# Patient Record
Sex: FEMALE | URBAN - METROPOLITAN AREA
[De-identification: names, ages, dates, MRNs, and addresses within clinical notes are randomized per-mention and may not be internally consistent; named-entity substitution may affect disease eponyms.]

---

## 2020-06-01 ENCOUNTER — OFFICE VISIT (OUTPATIENT)
Dept: URBAN - METROPOLITAN AREA SURGERY CENTER 8 | Facility: SURGERY CENTER | Age: 45
End: 2020-06-01

## 2020-06-08 ENCOUNTER — TELEPHONE ENCOUNTER (OUTPATIENT)
Dept: URBAN - METROPOLITAN AREA CLINIC 35 | Facility: CLINIC | Age: 45
End: 2020-06-08

## 2020-06-16 ENCOUNTER — OFFICE VISIT (OUTPATIENT)
Dept: URBAN - METROPOLITAN AREA CLINIC 31 | Facility: CLINIC | Age: 45
End: 2020-06-16

## 2020-06-16 VITALS — WEIGHT: 190 LBS | HEIGHT: 68 IN | BODY MASS INDEX: 28.79 KG/M2

## 2020-06-16 RX ORDER — AMITRIPTYLINE HYDROCHLORIDE 150 MG/1
1 TABLET, FILM COATED ORAL AT NIGHT
Status: ACTIVE | COMMUNITY

## 2020-06-16 RX ORDER — GINGER ROOT/GINGER ROOT EXT 262.5 MG
1 CAPSULE ORAL BID
Status: ACTIVE | COMMUNITY

## 2020-06-16 RX ORDER — DICLOFENAC POTASSIUM 50 MG/1
1 POWDER, FOR SOLUTION ORAL PRN
Status: ACTIVE | COMMUNITY

## 2020-06-16 RX ORDER — SODIUM SULFATE, POTASSIUM SULFATE, MAGNESIUM SULFATE 17.5; 3.13; 1.6 G/ML; G/ML; G/ML
AS DIRECTED SOLUTION, CONCENTRATE ORAL
Qty: 1 | Refills: 0 | Status: DISCONTINUED | COMMUNITY
Start: 2020-04-07

## 2020-06-16 RX ORDER — SUMATRIPTAN AND NAPROXEN SODIUM 85; 500 MG/1; MG/1
1 TABLET, FILM COATED ORAL PRN
Status: ACTIVE | COMMUNITY

## 2020-06-16 RX ORDER — LINACLOTIDE 290 UG/1
1 CAPSULE CAPSULE, GELATIN COATED ORAL ONCE A DAY
Qty: 90 CAPSULE | Refills: 2
Start: 2020-04-07

## 2020-06-16 RX ORDER — LINACLOTIDE 290 UG/1
1 CAPSULE CAPSULE, GELATIN COATED ORAL ONCE A DAY
Qty: 30 | Refills: 3 | Status: ACTIVE | COMMUNITY
Start: 2020-04-07

## 2020-06-16 NOTE — HPI-MIGRATED HPI
;     Colonoscopy Follow-Up : Patient presents today for a follow-up after the colonoscopy that was done on 06/01/2020. Patient was prescribed Linzess 290 mcg, daily, at her last office visit. She was having some constipation after her colonoscopy and was advised to take 1 capful of MiraLax along with the Linzess. Patient states this has worked very well. She takes the capful of MiraLax along with Linzess 290 mcg, daily. Patient is currently having 1 bowel movement per day with loose and unformed stools. She denies any blood or mucous in her stool. Patient denies melena. Patient requests refills of the Linzess 290 mcg, daily, sent in a 90 days supply, sent to confirmed pharmacy in EMR. ;

## 2020-11-17 ENCOUNTER — OFFICE VISIT (OUTPATIENT)
Dept: URBAN - METROPOLITAN AREA CLINIC 31 | Facility: CLINIC | Age: 45
End: 2020-11-17

## 2020-11-17 VITALS — BODY MASS INDEX: 29.55 KG/M2 | HEIGHT: 68 IN | WEIGHT: 195 LBS

## 2020-11-17 RX ORDER — LEVOTHYROXINE SODIUM 150 UG/1
1 TABLET IN THE MORNING ON AN EMPTY STOMACH TABLET ORAL ONCE A DAY
Status: ACTIVE | COMMUNITY

## 2020-11-17 RX ORDER — SUMATRIPTAN AND NAPROXEN SODIUM 85; 500 MG/1; MG/1
1 TABLET, FILM COATED ORAL PRN
Status: ACTIVE | COMMUNITY

## 2020-11-17 RX ORDER — LINACLOTIDE 290 UG/1
1 CAPSULE CAPSULE, GELATIN COATED ORAL ONCE A DAY
Qty: 90 CAPSULE | Refills: 2 | Status: ACTIVE | COMMUNITY
Start: 2020-04-07

## 2020-11-17 RX ORDER — AMITRIPTYLINE HYDROCHLORIDE 150 MG/1
1 TABLET, FILM COATED ORAL AT NIGHT
Status: ACTIVE | COMMUNITY

## 2020-11-17 RX ORDER — GINGER ROOT/GINGER ROOT EXT 262.5 MG
1 CAPSULE ORAL BID
Status: ACTIVE | COMMUNITY

## 2020-11-17 RX ORDER — TEGASEROD 6 MG/1
1 TABLET TABLET ORAL TWICE A DAY
Qty: 60 TABLET | Refills: 4 | OUTPATIENT
Start: 2020-11-17

## 2020-11-17 RX ORDER — DICLOFENAC POTASSIUM 50 MG/1
1 POWDER, FOR SOLUTION ORAL PRN
Status: ACTIVE | COMMUNITY

## 2020-11-17 NOTE — HPI-MIGRATED HPI
;     Constipation : Patient presents today for a follow-up office visit from 06/16/2020. She is currently taking Linzess 290, daily. She also takes MiraLax and takes 1 capful every other day. She changed to taking MiraLax every other day, due to watery stools. Patient states that when she takes the Linzess 290 mcg and MiraLax together, she is able to have more regular bowel movements, but stools are more watery. Taking the MiraLax every other day helps with stool formation, however, she does not go as easily. Patient denies any blood or mucous in her stool. She denies melena. Patient admits to extreme bloating, about every 5-7 days, with a very distended and hard stomach. Of note, patient states that her neurologist is following any NSAID use. She does not need refills at this time.;

## 2021-01-05 ENCOUNTER — OFFICE VISIT (OUTPATIENT)
Dept: URBAN - METROPOLITAN AREA CLINIC 31 | Facility: CLINIC | Age: 46
End: 2021-01-05

## 2021-01-05 VITALS — BODY MASS INDEX: 30.31 KG/M2 | HEIGHT: 68 IN | WEIGHT: 200 LBS

## 2021-01-05 RX ORDER — SUMATRIPTAN AND NAPROXEN SODIUM 85; 500 MG/1; MG/1
1 TABLET, FILM COATED ORAL PRN
Status: ACTIVE | COMMUNITY

## 2021-01-05 RX ORDER — DICLOFENAC POTASSIUM 50 MG/1
1 POWDER, FOR SOLUTION ORAL PRN
Status: ACTIVE | COMMUNITY

## 2021-01-05 RX ORDER — AMITRIPTYLINE HYDROCHLORIDE 150 MG/1
1 TABLET, FILM COATED ORAL AT NIGHT
Status: ACTIVE | COMMUNITY

## 2021-01-05 RX ORDER — TEGASEROD 6 MG/1
1 TABLET TABLET ORAL TWICE A DAY
Qty: 60 TABLET | Refills: 4 | Status: ON HOLD | COMMUNITY
Start: 2020-11-17

## 2021-01-05 RX ORDER — GINGER ROOT/GINGER ROOT EXT 262.5 MG
1 CAPSULE ORAL BID
Status: ACTIVE | COMMUNITY

## 2021-01-05 RX ORDER — LEVOTHYROXINE SODIUM 150 UG/1
1 TABLET IN THE MORNING ON AN EMPTY STOMACH TABLET ORAL ONCE A DAY
Status: ACTIVE | COMMUNITY

## 2021-01-05 RX ORDER — AMOXICILLIN 500 MG
AS DIRECTED CAPSULE ORAL
Status: ACTIVE | COMMUNITY

## 2021-01-05 RX ORDER — LINACLOTIDE 290 UG/1
1 CAPSULE CAPSULE, GELATIN COATED ORAL ONCE A DAY
Qty: 90 CAPSULE | Refills: 2 | Status: ACTIVE | COMMUNITY
Start: 2020-04-07

## 2021-01-05 NOTE — HPI-MIGRATED HPI
;     Constipation : Patient presents today for a follow-up office visit from 11/17/2020. Zelnorm was denied coverage by her insurance, following a prior authorization attempt. It did not work well anyway.  She is currently taking linzess 290 mcg and MiraLax 1/2 capful with improvement of symptoms. Currently has 2 complete bowel movements and 1 small movement per day. Stools are soft and formed without blood, mucus or melena. Admits to occasional episodes of bloating/gas when she eats certain food such as pop corn. Admits she will take Pantoprazole OTC with relief of symptoms. Patient tested negative for COVID and Positive for the Flu.;

## 2021-05-03 ENCOUNTER — ERX REFILL RESPONSE (OUTPATIENT)
Dept: URBAN - METROPOLITAN AREA CLINIC 35 | Facility: CLINIC | Age: 46
End: 2021-05-03

## 2021-05-03 RX ORDER — LINACLOTIDE 290 UG/1
1 CAPSULE CAPSULE, GELATIN COATED ORAL ONCE A DAY
Qty: 30 | Refills: 2

## 2021-08-03 ENCOUNTER — ERX REFILL RESPONSE (OUTPATIENT)
Dept: URBAN - METROPOLITAN AREA CLINIC 35 | Facility: CLINIC | Age: 46
End: 2021-08-03

## 2021-08-03 RX ORDER — LINACLOTIDE 290 UG/1
1 CAPSULE CAPSULE, GELATIN COATED ORAL ONCE A DAY
Qty: 30 | Refills: 2 | OUTPATIENT

## 2021-08-03 RX ORDER — LINACLOTIDE 290 UG/1
TAKE ONE CAPSULE BY MOUTH DAILY CAPSULE, GELATIN COATED ORAL
Qty: 30 CAPSULE | Refills: 2 | OUTPATIENT

## 2021-08-31 ENCOUNTER — TELEPHONE ENCOUNTER (OUTPATIENT)
Dept: URBAN - METROPOLITAN AREA CLINIC 35 | Facility: CLINIC | Age: 46
End: 2021-08-31

## 2021-08-31 RX ORDER — LINACLOTIDE 290 UG/1
TAKE ONE CAPSULE BY MOUTH DAILY CAPSULE, GELATIN COATED ORAL ONCE A DAY
Qty: 90 | Refills: 2 | OUTPATIENT

## 2022-01-04 ENCOUNTER — OFFICE VISIT (OUTPATIENT)
Dept: URBAN - METROPOLITAN AREA CLINIC 31 | Facility: CLINIC | Age: 47
End: 2022-01-04
Payer: COMMERCIAL

## 2022-01-04 ENCOUNTER — OFFICE VISIT (OUTPATIENT)
Dept: URBAN - METROPOLITAN AREA CLINIC 31 | Facility: CLINIC | Age: 47
End: 2022-01-04

## 2022-01-04 VITALS
SYSTOLIC BLOOD PRESSURE: 124 MMHG | BODY MASS INDEX: 31.52 KG/M2 | HEART RATE: 96 BPM | WEIGHT: 208 LBS | HEIGHT: 68 IN | OXYGEN SATURATION: 98 % | DIASTOLIC BLOOD PRESSURE: 82 MMHG

## 2022-01-04 DIAGNOSIS — K59.00 CONSTIPATION, UNSPECIFIED CONSTIPATION TYPE: ICD-10-CM

## 2022-01-04 DIAGNOSIS — R19.4 CHANGE IN BOWEL HABIT: ICD-10-CM

## 2022-01-04 DIAGNOSIS — K58.1 IRRITABLE BOWEL SYNDROME WITH CONSTIPATION: ICD-10-CM

## 2022-01-04 DIAGNOSIS — R14.0 BLOATING: ICD-10-CM

## 2022-01-04 PROCEDURE — 99213 OFFICE O/P EST LOW 20 MIN: CPT | Performed by: INTERNAL MEDICINE

## 2022-01-04 RX ORDER — LINACLOTIDE 290 UG/1
TAKE ONE CAPSULE BY MOUTH DAILY CAPSULE, GELATIN COATED ORAL ONCE A DAY
Qty: 90 | Refills: 2 | Status: ACTIVE | COMMUNITY

## 2022-01-04 RX ORDER — SUMATRIPTAN AND NAPROXEN SODIUM 85; 500 MG/1; MG/1
1 TABLET, FILM COATED ORAL PRN
Status: ACTIVE | COMMUNITY

## 2022-01-04 RX ORDER — LINACLOTIDE 290 UG/1
TAKE ONE CAPSULE BY MOUTH DAILY CAPSULE, GELATIN COATED ORAL ONCE A DAY
Qty: 90 | Refills: 2

## 2022-01-04 RX ORDER — ALPRAZOLAM 0.5 MG/1
1 TABLET TABLET ORAL PRN
Status: ACTIVE | COMMUNITY

## 2022-01-04 RX ORDER — AMITRIPTYLINE HYDROCHLORIDE 150 MG/1
1 TABLET, FILM COATED ORAL AT NIGHT
Status: ACTIVE | COMMUNITY

## 2022-01-04 RX ORDER — LEVOTHYROXINE SODIUM 150 UG/1
1 TABLET IN THE MORNING ON AN EMPTY STOMACH TABLET ORAL ONCE A DAY
Status: ACTIVE | COMMUNITY

## 2022-01-04 RX ORDER — GINGER ROOT/GINGER ROOT EXT 262.5 MG
1 CAPSULE ORAL BID
Status: ACTIVE | COMMUNITY

## 2022-01-04 RX ORDER — LEVOTHYROXINE SODIUM 125 UG/1
1 TABLET IN THE MORNING ON AN EMPTY STOMACH TABLET ORAL ONCE A DAY
Status: ACTIVE | COMMUNITY

## 2022-01-04 RX ORDER — CYCLOBENZAPRINE HYDROCHLORIDE 10 MG/1
1 TABLET TABLET, FILM COATED ORAL
Status: ACTIVE | COMMUNITY

## 2022-01-04 RX ORDER — TEGASEROD 6 MG/1
1 TABLET TABLET ORAL TWICE A DAY
Qty: 60 TABLET | Refills: 4 | Status: DISCONTINUED | COMMUNITY
Start: 2020-11-17

## 2022-01-04 RX ORDER — AMOXICILLIN 500 MG
AS DIRECTED CAPSULE ORAL
Status: ACTIVE | COMMUNITY

## 2022-01-04 RX ORDER — MULTIVIT-MIN/IRON/FOLIC ACID/K 18-600-40
2 CAPSULE ORAL ONCE A DAY
Status: ACTIVE | COMMUNITY

## 2022-01-04 RX ORDER — DICLOFENAC POTASSIUM 50 MG/1
1 POWDER, FOR SOLUTION ORAL PRN
Status: ACTIVE | COMMUNITY

## 2022-01-04 RX ORDER — SUMATRIPTAN AND NAPROXEN SODIUM 85; 500 MG/1; MG/1
1 TABLET, FILM COATED ORAL PRN
Status: DISCONTINUED | COMMUNITY
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
## 2022-01-04 NOTE — HPI-CONSTIPATION
Patient presents today for a follow-up office visit from 01/05/2021.  Constipation, unspecified constipation type   Notes: Plan/Rec: 1) Continue Miralax to 1/2 capful daily and Linzess 290 mcg daily. 2) FODMAP diet. 3) Push fluid intake. 4) Thyroid level testing and management with endocrinologist. 5) Repeat colonoscopy in 5 years (6/2025). 6) Followup in 1 year or sooner as needed.

## 2023-01-03 ENCOUNTER — OFFICE VISIT (OUTPATIENT)
Dept: URBAN - METROPOLITAN AREA CLINIC 31 | Facility: CLINIC | Age: 48
End: 2023-01-03

## 2023-01-20 ENCOUNTER — ERX REFILL RESPONSE (OUTPATIENT)
Dept: URBAN - METROPOLITAN AREA CLINIC 35 | Facility: CLINIC | Age: 48
End: 2023-01-20

## 2023-01-20 RX ORDER — LINACLOTIDE 290 UG/1
TAKE ONE CAPSULE BY MOUTH DAILY CAPSULE, GELATIN COATED ORAL ONCE A DAY
Qty: 90 | Refills: 2 | OUTPATIENT

## 2023-01-20 RX ORDER — LINACLOTIDE 290 UG/1
TAKE ONE CAPSULE BY MOUTH DAILY CAPSULE, GELATIN COATED ORAL
Qty: 90 CAPSULE | Refills: 0 | OUTPATIENT

## 2023-01-31 ENCOUNTER — OFFICE VISIT (OUTPATIENT)
Dept: URBAN - METROPOLITAN AREA CLINIC 31 | Facility: CLINIC | Age: 48
End: 2023-01-31
Payer: COMMERCIAL

## 2023-01-31 VITALS — WEIGHT: 220 LBS | HEIGHT: 68 IN | BODY MASS INDEX: 33.34 KG/M2

## 2023-01-31 DIAGNOSIS — K59.00 CONSTIPATION, UNSPECIFIED CONSTIPATION TYPE: ICD-10-CM

## 2023-01-31 DIAGNOSIS — R14.0 BLOATING: ICD-10-CM

## 2023-01-31 DIAGNOSIS — K58.1 IRRITABLE BOWEL SYNDROME WITH CONSTIPATION: ICD-10-CM

## 2023-01-31 DIAGNOSIS — R19.4 CHANGE IN BOWEL HABIT: ICD-10-CM

## 2023-01-31 PROBLEM — 14760008: Status: ACTIVE | Noted: 2020-04-07

## 2023-01-31 PROCEDURE — 99213 OFFICE O/P EST LOW 20 MIN: CPT | Performed by: INTERNAL MEDICINE

## 2023-01-31 RX ORDER — GINGER ROOT/GINGER ROOT EXT 262.5 MG
1 CAPSULE ORAL BID
Status: ACTIVE | COMMUNITY

## 2023-01-31 RX ORDER — DULOXETINE HYDROCHLORIDE 60 MG/1
1 CAPSULE CAPSULE, DELAYED RELEASE ORAL ONCE A DAY
Status: ACTIVE | COMMUNITY

## 2023-01-31 RX ORDER — AMOXICILLIN 500 MG
AS DIRECTED CAPSULE ORAL
Status: ACTIVE | COMMUNITY

## 2023-01-31 RX ORDER — TOPIRAMATE 100 MG/1
1 TABLET TABLET, COATED ORAL BID
Status: ACTIVE | COMMUNITY

## 2023-01-31 RX ORDER — LINACLOTIDE 290 UG/1
TAKE ONE CAPSULE BY MOUTH DAILY CAPSULE, GELATIN COATED ORAL
Qty: 90 CAPSULE | Refills: 0 | Status: ACTIVE | COMMUNITY

## 2023-01-31 RX ORDER — LEVOTHYROXINE SODIUM 125 UG/1
1 TABLET IN THE MORNING ON AN EMPTY STOMACH TABLET ORAL ONCE A DAY
Status: ACTIVE | COMMUNITY

## 2023-01-31 RX ORDER — MULTIVIT-MIN/IRON/FOLIC ACID/K 18-600-40
2 CAPSULE ORAL ONCE A DAY
Status: ACTIVE | COMMUNITY

## 2023-01-31 RX ORDER — LINACLOTIDE 290 UG/1
TAKE ONE CAPSULE BY MOUTH DAILY CAPSULE, GELATIN COATED ORAL ONCE A DAY
Qty: 90 | Refills: 4

## 2023-01-31 RX ORDER — DICLOFENAC POTASSIUM 50 MG/1
1 POWDER, FOR SOLUTION ORAL PRN
Status: ACTIVE | COMMUNITY

## 2023-01-31 RX ORDER — AMITRIPTYLINE HYDROCHLORIDE 150 MG/1
1 TABLET, FILM COATED ORAL AT NIGHT
Status: ACTIVE | COMMUNITY

## 2023-01-31 NOTE — HPI-CONSTIPATION
Patient presents today for a follow-up office visit from 01/0/2022. She is currently taking Linzess 290 mcg, daily, with good control of constipation. Patient is currently having 1 bowel movement per day with normal and formed stools. Sometimes she will have a second "smaller" bowel movement. She denies any blood or mucous in her stool. Patient denies melena. Patient has not had to strain. Patient denies abdominal pain or cramping. Patient requests refills of the Linzess 290 mcg, daily, in a 90-day supply, sent to the [facility] in EMR.

## 2024-01-30 ENCOUNTER — OFFICE VISIT (OUTPATIENT)
Dept: URBAN - METROPOLITAN AREA CLINIC 31 | Facility: CLINIC | Age: 49
End: 2024-01-30
Payer: COMMERCIAL

## 2024-01-30 ENCOUNTER — DASHBOARD ENCOUNTERS (OUTPATIENT)
Age: 49
End: 2024-01-30

## 2024-01-30 VITALS — WEIGHT: 251 LBS | HEIGHT: 68 IN | BODY MASS INDEX: 38.04 KG/M2

## 2024-01-30 DIAGNOSIS — Z12.11 SCREENING FOR COLON CANCER: ICD-10-CM

## 2024-01-30 DIAGNOSIS — K58.1 IRRITABLE BOWEL SYNDROME WITH CONSTIPATION: ICD-10-CM

## 2024-01-30 PROBLEM — 440630006: Status: ACTIVE | Noted: 2020-11-17

## 2024-01-30 PROCEDURE — 99213 OFFICE O/P EST LOW 20 MIN: CPT | Performed by: STUDENT IN AN ORGANIZED HEALTH CARE EDUCATION/TRAINING PROGRAM

## 2024-01-30 RX ORDER — DULOXETINE HYDROCHLORIDE 60 MG/1
1 CAPSULE CAPSULE, DELAYED RELEASE ORAL ONCE A DAY
Status: ACTIVE | COMMUNITY

## 2024-01-30 RX ORDER — GINGER ROOT/GINGER ROOT EXT 262.5 MG
1 CAPSULE ORAL BID
Status: ACTIVE | COMMUNITY

## 2024-01-30 RX ORDER — AMITRIPTYLINE HYDROCHLORIDE 150 MG/1
1 TABLET AT BEDTIME TABLET, FILM COATED ORAL ONCE A DAY
Status: ACTIVE | COMMUNITY

## 2024-01-30 RX ORDER — LINACLOTIDE 290 UG/1
1 CAPSULE AT LEAST 30 MINUTES BEFORE THE FIRST MEAL OF THE DAY ON AN EMPTY STOMACH CAPSULE, GELATIN COATED ORAL ONCE A DAY
Qty: 90 | Refills: 4
End: 2025-04-24

## 2024-01-30 RX ORDER — MULTIVIT-MIN/IRON/FOLIC ACID/K 18-600-40
AS DIRECTED CAPSULE ORAL ONCE A DAY
Status: ACTIVE | COMMUNITY

## 2024-01-30 RX ORDER — DICLOFENAC POTASSIUM 50 MG/1
AS DIRECTED POWDER, FOR SOLUTION ORAL PRN
Status: ACTIVE | COMMUNITY

## 2024-01-30 RX ORDER — ASCORBIC ACID 125 MG
AS DIRECTED TABLET,CHEWABLE ORAL ONCE A DAY
Status: ACTIVE | COMMUNITY

## 2024-01-30 RX ORDER — TOPIRAMATE 100 MG/1
1 TABLET TABLET, COATED ORAL TWICE A DAY
Status: ACTIVE | COMMUNITY

## 2024-01-30 RX ORDER — POLYETHYLENE GLYCOL 3350 17 G/17G
1 SCOOP MIXED WITH 8 OUNCES OF FLUID POWDER, FOR SOLUTION ORAL
Status: ACTIVE | COMMUNITY

## 2024-01-30 RX ORDER — LINACLOTIDE 290 UG/1
1 CAPSULE AT LEAST 30 MINUTES BEFORE THE FIRST MEAL OF THE DAY ON AN EMPTY STOMACH CAPSULE, GELATIN COATED ORAL ONCE A DAY
Refills: 4 | Status: ACTIVE | COMMUNITY

## 2024-01-30 RX ORDER — AMOXICILLIN 500 MG
AS DIRECTED CAPSULE ORAL ONCE A DAY
Status: ACTIVE | COMMUNITY

## 2024-01-30 RX ORDER — LEVOTHYROXINE SODIUM 125 UG/1
1 TABLET IN THE MORNING ON AN EMPTY STOMACH TABLET ORAL ONCE A DAY
Status: ACTIVE | COMMUNITY

## 2024-01-30 NOTE — HPI-TODAY'S VISIT:
48 year old female patient presents today for a routine yearly follow-up visit. HX IBS w/ constipation on linzess 290 mcg, has daily good BM 45-1 hr after medication, sometimes 1 additional movement. No blood in stools. No significant abdominal pain, intermittent gas discomfort sensation. She used to have significant pain improved on linzess. Sheas intermittent heartburn infrequent, takes TUMS PRN and controls  Last colonoscopy was completed on 06/01/2020 for constipation/bowel habit changes, with findings of hemorrhoids second degree. 5 yr recall for screening advised.   Last visit 01/31/2023 Patient presents today for a follow-up office visit from 01/0/2022. She is currently taking Linzess 290 mcg, daily, with good control of constipation. Patient is currently having 1 bowel movement per day with normal and formed stools. Sometimes she will have a second "smaller" bowel movement. She denies any blood or mucous in her stool. Patient denies melena. Patient has not had to strain. Patient denies abdominal pain or cramping. Patient requests refills of the Linzess 290 mcg, daily, in a 90-day supply, sent to the [facility] in EMR.

## 2025-02-04 ENCOUNTER — OFFICE VISIT (OUTPATIENT)
Dept: URBAN - METROPOLITAN AREA CLINIC 31 | Facility: CLINIC | Age: 50
End: 2025-02-04
Payer: COMMERCIAL

## 2025-02-04 ENCOUNTER — LAB OUTSIDE AN ENCOUNTER (OUTPATIENT)
Dept: URBAN - METROPOLITAN AREA CLINIC 31 | Facility: CLINIC | Age: 50
End: 2025-02-04

## 2025-02-04 VITALS
HEART RATE: 68 BPM | SYSTOLIC BLOOD PRESSURE: 100 MMHG | WEIGHT: 189 LBS | BODY MASS INDEX: 28.64 KG/M2 | OXYGEN SATURATION: 96 % | HEIGHT: 68 IN | DIASTOLIC BLOOD PRESSURE: 70 MMHG

## 2025-02-04 DIAGNOSIS — K58.1 IRRITABLE BOWEL SYNDROME WITH CONSTIPATION: ICD-10-CM

## 2025-02-04 DIAGNOSIS — Z12.11 SCREENING FOR COLON CANCER: ICD-10-CM

## 2025-02-04 PROCEDURE — 99213 OFFICE O/P EST LOW 20 MIN: CPT | Performed by: STUDENT IN AN ORGANIZED HEALTH CARE EDUCATION/TRAINING PROGRAM

## 2025-02-04 RX ORDER — DULOXETINE HYDROCHLORIDE 60 MG/1
1 CAPSULE CAPSULE, DELAYED RELEASE ORAL ONCE A DAY
Status: ACTIVE | COMMUNITY

## 2025-02-04 RX ORDER — ASCORBIC ACID 125 MG
AS DIRECTED TABLET,CHEWABLE ORAL ONCE A DAY
Status: ACTIVE | COMMUNITY

## 2025-02-04 RX ORDER — LINACLOTIDE 290 UG/1
1 CAPSULE AT LEAST 30 MINUTES BEFORE THE FIRST MEAL OF THE DAY ON AN EMPTY STOMACH CAPSULE, GELATIN COATED ORAL ONCE A DAY
Qty: 90 | Refills: 4 | Status: ACTIVE | COMMUNITY
End: 2025-04-24

## 2025-02-04 RX ORDER — AMITRIPTYLINE HYDROCHLORIDE 150 MG/1
1 TABLET AT BEDTIME TABLET, FILM COATED ORAL ONCE A DAY
Status: ACTIVE | COMMUNITY

## 2025-02-04 RX ORDER — AMOXICILLIN 500 MG
AS DIRECTED CAPSULE ORAL ONCE A DAY
Status: ACTIVE | COMMUNITY

## 2025-02-04 RX ORDER — POLYETHYLENE GLYCOL 3350 17 G/DOSE
1 SCOOP MIXED WITH 8 OUNCES OF FLUID POWDER (GRAM) ORAL
Status: ON HOLD | COMMUNITY

## 2025-02-04 RX ORDER — TOPIRAMATE 100 MG/1
1 TABLET TABLET, COATED ORAL TWICE A DAY
Status: ACTIVE | COMMUNITY

## 2025-02-04 RX ORDER — GINGER ROOT/GINGER ROOT EXT 262.5 MG
1 CAPSULE ORAL BID
Status: ACTIVE | COMMUNITY

## 2025-02-04 RX ORDER — DICLOFENAC POTASSIUM 50 MG/1
AS DIRECTED POWDER, FOR SOLUTION ORAL PRN
Status: ACTIVE | COMMUNITY

## 2025-02-04 RX ORDER — MULTIVIT-MIN/IRON/FOLIC ACID/K 18-600-40
AS DIRECTED CAPSULE ORAL ONCE A DAY
Status: ACTIVE | COMMUNITY

## 2025-02-04 RX ORDER — LINACLOTIDE 290 UG/1
1 CAPSULE AT LEAST 30 MINUTES BEFORE THE FIRST MEAL OF THE DAY ON AN EMPTY STOMACH CAPSULE, GELATIN COATED ORAL ONCE A DAY
Qty: 90 | Refills: 4

## 2025-02-04 RX ORDER — LEVOTHYROXINE SODIUM 88 UG/1
1 TABLET IN THE MORNING ON AN EMPTY STOMACH TABLET ORAL ONCE A DAY
Status: ACTIVE | COMMUNITY

## 2025-02-04 RX ORDER — SEMAGLUTIDE 2.4 MG/.75ML
0.75 ML INJECTION, SOLUTION SUBCUTANEOUS
Status: ACTIVE | COMMUNITY

## 2025-02-04 NOTE — HPI-TODAY'S VISIT:
49 year old female hx gasric sleeve, obesity, patient presents for followup for HX IBS w/ constipation.Reports BM are good. One BM per day. Occasional couple days w/o BM and takes a fleet enema but not frequent. Has done two times in past year. No blood. no abdominal pain.Appetite good. Heartburn symptoms on pantoprazole controlled. No dysphagia  On wegovy w/o side effects. Has lost significant weight on treatment. No vomitting.    Recent Labs Completed: 01/14/2025 WBC 6.8 wnl, HGB 13.0 wnl, MCV 90.0 wnl, Platelets 302 wnl.  Bilirubin 0.4 wnl, ALK Phos. 125 wnl, AST 22 wnl, ALT 31 H, Creatinine 0.72 wnl.   Last Visit(01/30/2024) 48 year old female patient presents today for a routine yearly follow-up visit. HX IBS w/ constipation on linzess 290 mcg, has daily good BM 45-1 hr after medication, sometimes 1 additional movement. No blood in stools. No significant abdominal pain, intermittent gas discomfort sensation. She used to have significant pain improved on linzess. Sheas intermittent heartburn infrequent, takes TUMS PRN and controls  Last colonoscopy was completed on 06/01/2020 for constipation/bowel habit changes, with findings of hemorrhoids second degree. 5 yr recall for screening advised.   Last visit 01/31/2023 Patient presents today for a follow-up office visit from 01/0/2022. She is currently taking Linzess 290 mcg, daily, with good control of constipation. Patient is currently having 1 bowel movement per day with normal and formed stools. Sometimes she will have a second "smaller" bowel movement. She denies any blood or mucous in her stool. Patient denies melena. Patient has not had to strain. Patient denies abdominal pain or cramping. Patient requests refills of the Linzess 290 mcg, daily, in a 90-day supply, sent to the [facility] in EMR.

## 2025-02-27 ENCOUNTER — OFFICE VISIT (OUTPATIENT)
Dept: URBAN - METROPOLITAN AREA SURGERY CENTER 8 | Facility: SURGERY CENTER | Age: 50
End: 2025-02-27

## 2025-02-27 ENCOUNTER — TELEPHONE ENCOUNTER (OUTPATIENT)
Dept: URBAN - METROPOLITAN AREA CLINIC 35 | Facility: CLINIC | Age: 50
End: 2025-02-27

## 2025-02-27 RX ORDER — LINACLOTIDE 290 UG/1
1 CAPSULE AT LEAST 30 MINUTES BEFORE THE FIRST MEAL OF THE DAY ON AN EMPTY STOMACH CAPSULE, GELATIN COATED ORAL ONCE A DAY
Qty: 90 | Refills: 4 | Status: ACTIVE | COMMUNITY

## 2025-02-27 RX ORDER — GINGER ROOT/GINGER ROOT EXT 262.5 MG
1 CAPSULE ORAL BID
Status: ACTIVE | COMMUNITY

## 2025-02-27 RX ORDER — DULOXETINE HYDROCHLORIDE 60 MG/1
1 CAPSULE CAPSULE, DELAYED RELEASE ORAL ONCE A DAY
Status: ACTIVE | COMMUNITY

## 2025-02-27 RX ORDER — TOPIRAMATE 100 MG/1
1 TABLET TABLET, COATED ORAL TWICE A DAY
Status: ACTIVE | COMMUNITY

## 2025-02-27 RX ORDER — SEMAGLUTIDE 2.4 MG/.75ML
0.75 ML INJECTION, SOLUTION SUBCUTANEOUS
Status: ACTIVE | COMMUNITY

## 2025-02-27 RX ORDER — POLYETHYLENE GLYCOL 3350 17 G/DOSE
1 SCOOP MIXED WITH 8 OUNCES OF FLUID POWDER (GRAM) ORAL
Status: ON HOLD | COMMUNITY

## 2025-02-27 RX ORDER — MULTIVIT-MIN/IRON/FOLIC ACID/K 18-600-40
AS DIRECTED CAPSULE ORAL ONCE A DAY
Status: ACTIVE | COMMUNITY

## 2025-02-27 RX ORDER — LEVOTHYROXINE SODIUM 88 UG/1
1 TABLET IN THE MORNING ON AN EMPTY STOMACH TABLET ORAL ONCE A DAY
Status: ACTIVE | COMMUNITY

## 2025-02-27 RX ORDER — SOD SULF/POT CHLORIDE/MAG SULF 1.479 G
12 TABLETS THE FIRST DOSE THE EVENING BEFORE AND SECOND DOSE THE MORNING OF COLONOSCOPY TABLET ORAL TWICE A DAY
Qty: 24 | Refills: 0 | OUTPATIENT
Start: 2025-02-27 | End: 2025-02-28

## 2025-02-27 RX ORDER — ASCORBIC ACID 125 MG
AS DIRECTED TABLET,CHEWABLE ORAL ONCE A DAY
Status: ACTIVE | COMMUNITY

## 2025-02-27 RX ORDER — AMOXICILLIN 500 MG
AS DIRECTED CAPSULE ORAL ONCE A DAY
Status: ACTIVE | COMMUNITY

## 2025-02-27 RX ORDER — AMITRIPTYLINE HYDROCHLORIDE 150 MG/1
1 TABLET AT BEDTIME TABLET, FILM COATED ORAL ONCE A DAY
Status: ACTIVE | COMMUNITY

## 2025-02-27 RX ORDER — DICLOFENAC POTASSIUM 50 MG/1
AS DIRECTED POWDER, FOR SOLUTION ORAL PRN
Status: ACTIVE | COMMUNITY

## 2025-03-03 ENCOUNTER — CLAIMS CREATED FROM THE CLAIM WINDOW (OUTPATIENT)
Dept: URBAN - METROPOLITAN AREA SURGERY CENTER 8 | Facility: SURGERY CENTER | Age: 50
End: 2025-03-03
Payer: COMMERCIAL

## 2025-03-03 ENCOUNTER — CLAIMS CREATED FROM THE CLAIM WINDOW (OUTPATIENT)
Dept: URBAN - METROPOLITAN AREA CLINIC 4 | Facility: CLINIC | Age: 50
End: 2025-03-03
Payer: COMMERCIAL

## 2025-03-03 DIAGNOSIS — K63.89 OTHER SPECIFIED DISEASES OF INTESTINE: ICD-10-CM

## 2025-03-03 DIAGNOSIS — Z12.11 COLON CANCER SCREENING: ICD-10-CM

## 2025-03-03 DIAGNOSIS — D12.5 BENIGN NEOPLASM OF SIGMOID COLON: ICD-10-CM

## 2025-03-03 DIAGNOSIS — K63.5 POLYP OF COLON: ICD-10-CM

## 2025-03-03 PROCEDURE — 00812 ANES LWR INTST SCR COLSC: CPT | Performed by: REGISTERED NURSE

## 2025-03-03 PROCEDURE — 45385 COLONOSCOPY W/LESION REMOVAL: CPT | Performed by: STUDENT IN AN ORGANIZED HEALTH CARE EDUCATION/TRAINING PROGRAM

## 2025-03-03 PROCEDURE — 88305 TISSUE EXAM BY PATHOLOGIST: CPT | Performed by: PATHOLOGY

## 2025-03-03 RX ORDER — DULOXETINE HYDROCHLORIDE 60 MG/1
1 CAPSULE CAPSULE, DELAYED RELEASE ORAL ONCE A DAY
Status: ACTIVE | COMMUNITY

## 2025-03-03 RX ORDER — POLYETHYLENE GLYCOL 3350 17 G/DOSE
1 SCOOP MIXED WITH 8 OUNCES OF FLUID POWDER (GRAM) ORAL
Status: ON HOLD | COMMUNITY

## 2025-03-03 RX ORDER — AMOXICILLIN 500 MG
AS DIRECTED CAPSULE ORAL ONCE A DAY
Status: ACTIVE | COMMUNITY

## 2025-03-03 RX ORDER — DICLOFENAC POTASSIUM 50 MG/1
AS DIRECTED POWDER, FOR SOLUTION ORAL PRN
Status: ACTIVE | COMMUNITY

## 2025-03-03 RX ORDER — MULTIVIT-MIN/IRON/FOLIC ACID/K 18-600-40
AS DIRECTED CAPSULE ORAL ONCE A DAY
Status: ACTIVE | COMMUNITY

## 2025-03-03 RX ORDER — TOPIRAMATE 100 MG/1
1 TABLET TABLET, COATED ORAL TWICE A DAY
Status: ACTIVE | COMMUNITY

## 2025-03-03 RX ORDER — LINACLOTIDE 290 UG/1
1 CAPSULE AT LEAST 30 MINUTES BEFORE THE FIRST MEAL OF THE DAY ON AN EMPTY STOMACH CAPSULE, GELATIN COATED ORAL ONCE A DAY
Qty: 90 | Refills: 4 | Status: ACTIVE | COMMUNITY

## 2025-03-03 RX ORDER — AMITRIPTYLINE HYDROCHLORIDE 150 MG/1
1 TABLET AT BEDTIME TABLET, FILM COATED ORAL ONCE A DAY
Status: ACTIVE | COMMUNITY

## 2025-03-03 RX ORDER — SEMAGLUTIDE 2.4 MG/.75ML
0.75 ML INJECTION, SOLUTION SUBCUTANEOUS
Status: ACTIVE | COMMUNITY

## 2025-03-03 RX ORDER — GINGER ROOT/GINGER ROOT EXT 262.5 MG
1 CAPSULE ORAL BID
Status: ACTIVE | COMMUNITY

## 2025-03-03 RX ORDER — LEVOTHYROXINE SODIUM 88 UG/1
1 TABLET IN THE MORNING ON AN EMPTY STOMACH TABLET ORAL ONCE A DAY
Status: ACTIVE | COMMUNITY

## 2025-03-03 RX ORDER — ASCORBIC ACID 125 MG
AS DIRECTED TABLET,CHEWABLE ORAL ONCE A DAY
Status: ACTIVE | COMMUNITY

## 2025-03-12 ENCOUNTER — TELEPHONE ENCOUNTER (OUTPATIENT)
Dept: URBAN - METROPOLITAN AREA CLINIC 35 | Facility: CLINIC | Age: 50
End: 2025-03-12

## 2025-03-13 ENCOUNTER — LAB OUTSIDE AN ENCOUNTER (OUTPATIENT)
Dept: URBAN - METROPOLITAN AREA CLINIC 35 | Facility: CLINIC | Age: 50
End: 2025-03-13

## 2025-05-02 ENCOUNTER — TELEPHONE ENCOUNTER (OUTPATIENT)
Dept: URBAN - METROPOLITAN AREA MEDICAL CENTER 28 | Facility: MEDICAL CENTER | Age: 50
End: 2025-05-02

## 2025-07-01 ENCOUNTER — TELEPHONE ENCOUNTER (OUTPATIENT)
Dept: URBAN - METROPOLITAN AREA MEDICAL CENTER 28 | Facility: MEDICAL CENTER | Age: 50
End: 2025-07-01

## 2025-07-17 ENCOUNTER — OFFICE VISIT (OUTPATIENT)
Dept: URBAN - METROPOLITAN AREA MEDICAL CENTER 28 | Facility: MEDICAL CENTER | Age: 50
End: 2025-07-17

## 2025-07-22 ENCOUNTER — TELEPHONE ENCOUNTER (OUTPATIENT)
Dept: URBAN - METROPOLITAN AREA CLINIC 36 | Facility: CLINIC | Age: 50
End: 2025-07-22